# Patient Record
Sex: MALE | Race: WHITE | NOT HISPANIC OR LATINO | Employment: UNEMPLOYED | URBAN - METROPOLITAN AREA
[De-identification: names, ages, dates, MRNs, and addresses within clinical notes are randomized per-mention and may not be internally consistent; named-entity substitution may affect disease eponyms.]

---

## 2017-08-09 ENCOUNTER — OFFICE VISIT (OUTPATIENT)
Dept: CARDIOLOGY | Facility: PHYSICIAN GROUP | Age: 62
End: 2017-08-09
Payer: MEDICAID

## 2017-08-09 VITALS
SYSTOLIC BLOOD PRESSURE: 130 MMHG | BODY MASS INDEX: 24.44 KG/M2 | DIASTOLIC BLOOD PRESSURE: 88 MMHG | HEART RATE: 85 BPM | HEIGHT: 69 IN | OXYGEN SATURATION: 94 % | WEIGHT: 165 LBS

## 2017-08-09 DIAGNOSIS — I45.10 RIGHT BUNDLE BRANCH BLOCK: ICD-10-CM

## 2017-08-09 DIAGNOSIS — E78.5 DYSLIPIDEMIA: ICD-10-CM

## 2017-08-09 DIAGNOSIS — Z82.49 FAMILY HISTORY OF CORONARY ARTERY DISEASE: ICD-10-CM

## 2017-08-09 PROCEDURE — 99204 OFFICE O/P NEW MOD 45 MIN: CPT | Performed by: INTERNAL MEDICINE

## 2017-08-09 RX ORDER — TAMSULOSIN HYDROCHLORIDE 0.4 MG/1
CAPSULE ORAL
Refills: 3 | COMMUNITY
Start: 2017-07-07

## 2017-08-09 RX ORDER — ATORVASTATIN CALCIUM 40 MG/1
40 TABLET, FILM COATED ORAL EVERY EVENING
Refills: 3 | COMMUNITY
Start: 2017-06-30

## 2017-08-09 RX ORDER — ALBUTEROL SULFATE 90 UG/1
AEROSOL, METERED RESPIRATORY (INHALATION)
Refills: 5 | COMMUNITY
Start: 2017-07-07

## 2017-08-09 ASSESSMENT — ENCOUNTER SYMPTOMS
EYE REDNESS: 1
PALPITATIONS: 1
INSOMNIA: 1
TINGLING: 1
HEARTBURN: 1

## 2017-08-09 NOTE — Clinical Note
Name:          Tai Long   YOB: 1955  Date:     8/9/2017      MD Maximiliano Pagan MD  [address], [place] [address]  [place] [address]  Phone: [phone]  Back Line: [phone]  Fax: [phone]  E-mail: [email or web address]   Dear Dr. rSivastava,    We had the pleasure of seeing your patient, Tai Long, in Cardiology Clinic at [facility] and Vascular today.    As you know, he is a 62-year-old man with a history of COPD, mild carotid stenosis, and prediabetes referred for evaluation of a right bundle branch block on his EKG.    Though I would generally not suspect structural heart disease with his right bundle branch block in the setting of his other risk factors for obstructive coronary disease including dyslipidemia and his family history of coronary artery disease I did order an echocardiogram. Otherwise at this time I have neither changed his cardiovascular regimen consisting of atorvastatin nor ordered additional testing.    We will have the patient follow-up in 3 months.    Thank you for the referral and please do not hesitate to contact me at any time. My contact information is listed above.    This note was dictated using Dragon speech recognition software.     A full note including my physical examination and a full list of rectified medications is available in our medical record, and can be faxed as well.    Maximiliano Salazar MD  Cardiologist  [facility]

## 2017-08-09 NOTE — MR AVS SNAPSHOT
"        Tai Long   2017 9:45 AM   Office Visit   MRN: [id number]    Department:  [facility] Phone:  [phone]    Description:  Male : 1955   Provider:  Maximiliano Salazar M.D.           Reason for Visit     New Patient           Allergies as of 2017     Allergen Noted Reactions    Hydrocodone-Acetaminophen 2017         You were diagnosed with     Dyslipidemia   [537098]       Right bundle branch block   [426.4.ICD-9-CM]         Vital Signs     Blood Pressure Pulse Height Weight Body Mass Index Oxygen Saturation    130/88 mmHg 85 1.753 m (5' 9.02\") 74.844 kg (165 lb) 24.36 kg/m2 94%    Smoking Status                   Former Smoker           Basic Information     Date Of Birth Sex Race Preferred Language       1955 Male White English       Your appointments     Sep 13, 2017  7:45 AM   [facility]    [address] [address]   [phone]            2017  9:15 AM   FOLLOW UP with Maximiliano Salazar M.D.   [facility]    [address] [address]   [phone]              Problem List              ICD-10-CM Priority Class Noted - Resolved    Dyslipidemia E78.5   2017 - Present    Right bundle branch block I45.10   2017 - Present      Health Maintenance     Patient has no pending health maintenance at this time      Current Immunizations     No immunizations on file.      Below and/or attached are the medications your provider expects you to take. Review all of your home medications and newly ordered medications with your provider and/or pharmacist. Follow medication instructions as directed by your provider and/or pharmacist. Please keep your medication list with you and share with your provider. Update the information when medications are discontinued, doses are changed, or new " medications (including over-the-counter products) are added; and carry medication information at all times in the event of emergency situations     Allergies:  HYDROCODONE-ACETAMINOPHEN - (reactions not documented)               Medications  Valid as of: August 09, 2017 - 10:19 AM    Generic Name Brand Name Tablet Size Instructions for use    Albuterol Sulfate (Aero Soln) VENTOLIN  (90 BASE) MCG/ACT TAKE 1-2 PUFFS BY INHALATION EVERY 4 HOURS AS NEEDED FOR COUGH, WHEEZING, OR SHORTNESS OF BREATH        Atorvastatin Calcium (Tab) LIPITOR 40 MG Take 40 mg by mouth every evening. For cholesterol        Fluticasone Furoate-Vilanterol (AEROSOL POWDER, BREATH ACTIVATED) BREO ELLIPTA 200-25 MCG/INH TAKE ONE PUFF ONE TIME DAILY, FOR BREATHING        Tamsulosin HCl (Cap) FLOMAX 0.4 MG TAKE ONE CAPSULE BY MOUTH EVERY DAY 30 MINUTES AFTER SAME MEAL        .                 Medicines prescribed today were sent to:     None      Medication refill instructions:       If your prescription bottle indicates you have medication refills left, it is not necessary to call your provider’s office. Please contact your pharmacy and they will refill your medication.    If your prescription bottle indicates you do not have any refills left, you may request refills at any time through one of the following ways: The online Metaplace system (except Urgent Care), by calling your provider’s office, or by asking your pharmacy to contact your provider’s office with a refill request. Medication refills are processed only during regular business hours and may not be available until the next business day. Your provider may request additional information or to have a follow-up visit with you prior to refilling your medication.   *Please Note: Medication refills are assigned a new Rx number when refilled electronically. Your pharmacy may indicate that no refills were authorized even though a new prescription for the same medication is available at  the pharmacy. Please request the medicine by name with the pharmacy before contacting your provider for a refill.        Your To Do List     Future Labs/Procedures Complete By Expires    Echocardiogram Comp w/o Cont  As directed 8/9/2018         Pixate Access Code: [email or web address]  Expires: 9/8/2017 10:17 AM    Weeleohart  A secure, online tool to manage your health information     [employer] Pixate® is a secure, online tool that connects you to your personalized health information from the privacy of your home -- day or night - making it very easy for you to manage your healthcare. Once the activation process is completed, you can even access your medical information using the Pixate tawnya, which is available for free in the Apple Tawnya store or Google Play store.     Pixate provides the following levels of access (as shown below):   My Chart Features   [facility] Doctor [facility]  Special[facility]  Urgent  Care [facility]  Primary Care  Doctor   Email your healthcare team securely and privately 24/7 X X X    Manage appointments: schedule your next appointment; view details of past/upcoming appointments X      Request prescription refills. X      View recent personal medical records, including lab and immunizations X X X X   View health record, including health history, allergies, medications X X X X   Read reports about your outpatient visits, procedures, consult and ER notes X X X X   See your discharge summary, which is a recap of your hospital and/or ER visit that includes your diagnosis, lab results, and care plan. X X       How to register for Pixate:  1. Go to  [email or web address].  2. Click on the Sign Up Now box, which takes you to the New Member Sign Up page. You will need to provide the following information:  a. Enter your Pixate Access Code exactly as it appears at the top of this page. (You will not need to use this code after you’ve completed the sign-up process. If you do not  sign up before the expiration date, you must request a new code.)   b. Enter your date of birth.   c. Enter your home email address.   d. Click Submit, and follow the next screen’s instructions.  3. Create a Solexant ID. This will be your Solexant login ID and cannot be changed, so think of one that is secure and easy to remember.  4. Create a In*Situ Architecturet password. You can change your password at any time.  5. Enter your Password Reset Question and Answer. This can be used at a later time if you forget your password.   6. Enter your e-mail address. This allows you to receive e-mail notifications when new information is available in Solexant.  7. Click Sign Up. You can now view your health information.    For assistance activating your Solexant account, call [phone]

## 2017-08-09 NOTE — Clinical Note
[facility]   [address] [place] [address]  Phone: [phone]  Fax: [phone]              Tai Long  1955    Encounter Date: 8/9/2017    Maximiliano Salazar M.D.          PROGRESS NOTE:  Subjective:   Tai Long is a 62 -year-old man with a history of COPD, mild carotid stenosis, and prediabetes referred for evaluation of a right bundle branch block on his EKG.    He has few cardiovascular complaints today coming in to discuss his right bundle branch block seen on EKG. He does tell me that he has palpitations about every 2 months that lasted 15 seconds. He describes it as a right-sided fluttering that does not radiate to his neck. It is not severe nor concerning to him. Apart from that, he has no cardiovascular complaints or exercise intolerance. He is a former smoker with COPD, and a retired meyer.    Past Medical History   Diagnosis Date   • Dyslipidemia 8/9/2017   • Right bundle branch block 8/9/2017   • BPH (benign prostatic hyperplasia)    • Pre-diabetes    • COPD (chronic obstructive pulmonary disease) (CMS-[facility])    • Former smoker    • Right bundle branch block 8/9/2017   • Family history of coronary artery disease 8/9/2017     History reviewed. No pertinent past surgical history.  Family History   Problem Relation Age of Onset   • Heart Attack Father 55     History   Smoking status   • Former Smoker -- 45 years   • Quit date: 01/01/2015   Smokeless tobacco   • Not on file     Allergies   Allergen Reactions   • Hydrocodone-Acetaminophen      Outpatient Encounter Prescriptions as of 8/9/2017   Medication Sig Dispense Refill   • BREO ELLIPTA 200-25 MCG/INH AEROSOL POWDER, BREATH ACTIVATED TAKE ONE PUFF ONE TIME DAILY, FOR BREATHING  5   • tamsulosin (FLOMAX) 0.4 MG capsule TAKE ONE CAPSULE BY MOUTH EVERY DAY 30 MINUTES AFTER SAME MEAL  3   • VENTOLIN  (90 BASE) MCG/ACT Aero Soln inhalation aerosol TAKE 1-2  "PUFFS BY INHALATION EVERY 4 HOURS AS NEEDED FOR COUGH, WHEEZING, OR SHORTNESS OF BREATH  5   • atorvastatin (LIPITOR) 40 MG Tab Take 40 mg by mouth every evening. For cholesterol  3     No facility-administered encounter medications on file as of 8/9/2017.     Review of Systems   Eyes: Positive for redness.   Cardiovascular: Positive for palpitations.   Gastrointestinal: Positive for heartburn.   Neurological: Positive for tingling.   Psychiatric/Behavioral: The patient has insomnia.    All other systems reviewed and are negative.       Objective:   /88 mmHg  Pulse 85  Ht 1.753 m (5' 9.02\")  Wt 74.844 kg (165 lb)  BMI 24.36 kg/m2  SpO2 94%    Physical Exam   Constitutional: He is oriented to person, place, and time. He appears well-developed and well-nourished. No distress.   Very pleasant, middle-age man in no distress   HENT:   Head: Normocephalic and atraumatic.   Eyes: Conjunctivae and EOM are normal. Pupils are equal, round, and reactive to light. No scleral icterus.   Neck: Neck supple. No JVD present. No tracheal deviation present.   Cardiovascular: Normal rate, regular rhythm, normal heart sounds and intact distal pulses.  Exam reveals no gallop and no friction rub.    No murmur heard.  Pulses:       Dorsalis pedis pulses are 2+ on the right side, and 2+ on the left side.   No carotid bruits   Pulmonary/Chest: Effort normal and breath sounds normal. No stridor. No respiratory distress. He has no wheezes. He has no rales.   Abdominal: Soft. Bowel sounds are normal. He exhibits no distension.   Musculoskeletal: He exhibits no edema.   Neurological: He is alert and oriented to person, place, and time.   Skin: Skin is warm and dry. No rash noted. He is not diaphoretic. No erythema. No pallor.   Psychiatric: He has a normal mood and affect. Judgment and thought content normal.   Vitals reviewed.    I reviewed his EKG from June that documented a right bundle branch block, rhythm is sinus and no other " ischemic changes were present    Assessment:     1. Dyslipidemia  LIPID PANEL   2. Right bundle branch block  Echocardiogram Comp w/o Cont   3. Family history of coronary artery disease         Medical Decision Making:  Today's Assessment / Status / Plan:     Though I would generally not suspect structural heart disease with his right bundle branch block in the setting of his other risk factors for obstructive coronary disease including dyslipidemia and his family history of coronary artery disease I did order an echocardiogram. Otherwise at this time I have neither changed his cardiovascular regimen consisting of atorvastatin nor ordered additional testing.    Maximiliano aSlazar MD  Cardiologist, [facility] [place]             Name:          Tai Long   YOB: 1955  Date:     8/9/2017      MD Maximiliano Pagan MD  [address], [address] [address]  Phone: [phone]  Back Line: [phone]  Fax: [phone]  E-mail: [email or web address]   Dear Dr. Srivastava,    We had the pleasure of seeing your patient, Tai Long, in Cardiology Clinic at [facility] and Vascular today.    As you know, he is a 62-year-old man with a history of COPD, mild carotid stenosis, and prediabetes referred for evaluation of a right bundle branch block on his EKG.    Though I would generally not suspect structural heart disease with his right bundle branch block in the setting of his other risk factors for obstructive coronary disease including dyslipidemia and his family history of coronary artery disease I did order an echocardiogram. Otherwise at this time I have neither changed his cardiovascular regimen consisting of atorvastatin nor ordered additional testing.    We will have the patient follow-up in 3 months.    Thank you for the referral and please do not hesitate to contact me at any time. My contact information is listed above.    This note was dictated using Dragon  speech recognition software.     A full note including my physical examination and a full list of rectified medications is available in our medical record, and can be faxed as well.    Maximiliano Salazar MD  Cardiologist  [facility]        Connie Srivastava M.D.  [address] [address]  VIA Facsimile: [phone]

## 2017-08-09 NOTE — Clinical Note
Name:          Tai Long   YOB: 1955  Date:     8/9/2017      MD Maximiliano Pagan MD  [address] [place] [address]  [place] [address]  Phone: [phone]  Back Line: [phone]  Fax: [phone]  E-mail: [email or web address]   Dear Dr. Srivastava,    We had the pleasure of seeing your patient, Tai Long, in Cardiology Clinic at [facility] and Vascular today.    As you know, he is a 62-year-old man with a history of COPD, mild carotid stenosis, and prediabetes referred for evaluation of a right bundle branch block on his EKG.    Though I would generally not suspect structural heart disease with his right bundle branch block in the setting of his other risk factors for obstructive coronary disease including dyslipidemia and his family history of coronary artery disease I did order an echocardiogram. Otherwise at this time I have neither changed his cardiovascular regimen consisting of atorvastatin nor ordered additional testing.    We will have the patient follow-up in 3 months.    Thank you for the referral and please do not hesitate to contact me at any time. My contact information is listed above.    This note was dictated using Dragon speech recognition software.     A full note including my physical examination and a full list of rectified medications is available in our medical record, and can be faxed as well.    Maximiliano Salazar MD  Cardiologist  [facility]

## 2017-08-09 NOTE — PROGRESS NOTES
Subjective:   Tai Long is a 62 -year-old man with a history of COPD, mild carotid stenosis, and prediabetes referred for evaluation of a right bundle branch block on his EKG.    He has few cardiovascular complaints today coming in to discuss his right bundle branch block seen on EKG. He does tell me that he has palpitations about every 2 months that lasted 15 seconds. He describes it as a right-sided fluttering that does not radiate to his neck. It is not severe nor concerning to him. Apart from that, he has no cardiovascular complaints or exercise intolerance. He is a former smoker with COPD, and a retired meyer.    Past Medical History   Diagnosis Date   • Dyslipidemia 8/9/2017   • Right bundle branch block 8/9/2017   • BPH (benign prostatic hyperplasia)    • Pre-diabetes    • COPD (chronic obstructive pulmonary disease) (CMS-[facility])    • Former smoker    • Right bundle branch block 8/9/2017   • Family history of coronary artery disease 8/9/2017     History reviewed. No pertinent past surgical history.  Family History   Problem Relation Age of Onset   • Heart Attack Father 55     History   Smoking status   • Former Smoker -- 45 years   • Quit date: 01/01/2015   Smokeless tobacco   • Not on file     Allergies   Allergen Reactions   • Hydrocodone-Acetaminophen      Outpatient Encounter Prescriptions as of 8/9/2017   Medication Sig Dispense Refill   • BREO ELLIPTA 200-25 MCG/INH AEROSOL POWDER, BREATH ACTIVATED TAKE ONE PUFF ONE TIME DAILY, FOR BREATHING  5   • tamsulosin (FLOMAX) 0.4 MG capsule TAKE ONE CAPSULE BY MOUTH EVERY DAY 30 MINUTES AFTER SAME MEAL  3   • VENTOLIN  (90 BASE) MCG/ACT Aero Soln inhalation aerosol TAKE 1-2 PUFFS BY INHALATION EVERY 4 HOURS AS NEEDED FOR COUGH, WHEEZING, OR SHORTNESS OF BREATH  5   • atorvastatin (LIPITOR) 40 MG Tab Take 40 mg by mouth every evening. For cholesterol  3     No facility-administered encounter medications on file as of 8/9/2017.     Review of Systems  "  Eyes: Positive for redness.   Cardiovascular: Positive for palpitations.   Gastrointestinal: Positive for heartburn.   Neurological: Positive for tingling.   Psychiatric/Behavioral: The patient has insomnia.    All other systems reviewed and are negative.       Objective:   /88 mmHg  Pulse 85  Ht 1.753 m (5' 9.02\")  Wt 74.844 kg (165 lb)  BMI 24.36 kg/m2  SpO2 94%    Physical Exam   Constitutional: He is oriented to person, place, and time. He appears well-developed and well-nourished. No distress.   Very pleasant, middle-age man in no distress   HENT:   Head: Normocephalic and atraumatic.   Eyes: Conjunctivae and EOM are normal. Pupils are equal, round, and reactive to light. No scleral icterus.   Neck: Neck supple. No JVD present. No tracheal deviation present.   Cardiovascular: Normal rate, regular rhythm, normal heart sounds and intact distal pulses.  Exam reveals no gallop and no friction rub.    No murmur heard.  Pulses:       Dorsalis pedis pulses are 2+ on the right side, and 2+ on the left side.   No carotid bruits   Pulmonary/Chest: Effort normal and breath sounds normal. No stridor. No respiratory distress. He has no wheezes. He has no rales.   Abdominal: Soft. Bowel sounds are normal. He exhibits no distension.   Musculoskeletal: He exhibits no edema.   Neurological: He is alert and oriented to person, place, and time.   Skin: Skin is warm and dry. No rash noted. He is not diaphoretic. No erythema. No pallor.   Psychiatric: He has a normal mood and affect. Judgment and thought content normal.   Vitals reviewed.    I reviewed his EKG from June that documented a right bundle branch block, rhythm is sinus and no other ischemic changes were present    Assessment:     1. Dyslipidemia  LIPID PANEL   2. Right bundle branch block  Echocardiogram Comp w/o Cont   3. Family history of coronary artery disease         Medical Decision Making:  Today's Assessment / Status / Plan:     Though I would " generally not suspect structural heart disease with his right bundle branch block in the setting of his other risk factors for obstructive coronary disease including dyslipidemia and his family history of coronary artery disease I did order an echocardiogram. Otherwise at this time I have neither changed his cardiovascular regimen consisting of atorvastatin nor ordered additional testing.    Maximiliano Salazar MD  Cardiologist, [facility] [place]

## 2017-08-09 NOTE — PROGRESS NOTES
Name:          Tai Long   YOB: 1955  Date:     8/9/2017      MD Maximiliano Pagan MD  [address], [place] [address]  [place] [address]  Phone: [phone]  Back Line: [phone]  Fax: [phone]  E-mail: [email or web address]   Dear Dr. Srivastava,    We had the pleasure of seeing your patient, Tai Long, in Cardiology Clinic at [facility] and Vascular today.    As you know, he is a 62-year-old man with a history of COPD, mild carotid stenosis, and prediabetes referred for evaluation of a right bundle branch block on his EKG.    Though I would generally not suspect structural heart disease with his right bundle branch block in the setting of his other risk factors for obstructive coronary disease including dyslipidemia and his family history of coronary artery disease I did order an echocardiogram. Otherwise at this time I have neither changed his cardiovascular regimen consisting of atorvastatin nor ordered additional testing.    We will have the patient follow-up in 3 months.    Thank you for the referral and please do not hesitate to contact me at any time. My contact information is listed above.    This note was dictated using Dragon speech recognition software.     A full note including my physical examination and a full list of rectified medications is available in our medical record, and can be faxed as well.    Maximiliano Salazar MD  Cardiologist  [facility] and [facility] Health

## 2017-09-13 ENCOUNTER — NON-PROVIDER VISIT (OUTPATIENT)
Dept: CARDIOLOGY | Facility: PHYSICIAN GROUP | Age: 62
End: 2017-09-13
Payer: MEDICAID

## 2017-09-13 DIAGNOSIS — I45.10 RIGHT BUNDLE BRANCH BLOCK: ICD-10-CM

## 2017-09-14 ENCOUNTER — TELEPHONE (OUTPATIENT)
Dept: CARDIOLOGY | Facility: MEDICAL CENTER | Age: 62
End: 2017-09-14

## 2017-09-14 LAB
LV EJECT FRACT  99904: 55
LV EJECT FRACT MOD 2C 99903: 50.87
LV EJECT FRACT MOD 4C 99902: 50.53
LV EJECT FRACT MOD BP 99901: 52.14

## 2017-09-14 NOTE — TELEPHONE ENCOUNTER
----- Message from Maximiliano Salazar M.D. sent at 9/14/2017 10:52 AM PDT -----  Normal echocardiogram (normal heart muscle function, normal valves)

## 2017-09-14 NOTE — LETTER
September 14, 2017        Tai Long  [address]  [place] [address]        Dear Tai:    Your cardiologist Dr. Maximiliano Salazar has reviewed your echocardiogram from 9/13/2017.  The results are normal.    If you have any questions, please call the office at [phone].        Sincerely,              [facility]

## 2017-09-14 NOTE — TELEPHONE ENCOUNTER
Tried calling patient again, no answer. Mailed patient a letter regarding his normal echocardiogram result.    PEDRO PABLO PAYAN

## 2017-09-14 NOTE — TELEPHONE ENCOUNTER
Left patient a voicemail with instructions to call the office for test results (Echocardiogram 9/13/2017).    PEDRO PABLO RN

## 2017-11-29 ENCOUNTER — OFFICE VISIT (OUTPATIENT)
Dept: CARDIOLOGY | Facility: PHYSICIAN GROUP | Age: 62
End: 2017-11-29
Payer: MEDICAID

## 2017-11-29 VITALS
OXYGEN SATURATION: 94 % | BODY MASS INDEX: 25.03 KG/M2 | WEIGHT: 169 LBS | HEIGHT: 69 IN | SYSTOLIC BLOOD PRESSURE: 140 MMHG | DIASTOLIC BLOOD PRESSURE: 88 MMHG | HEART RATE: 99 BPM

## 2017-11-29 DIAGNOSIS — E78.5 DYSLIPIDEMIA: ICD-10-CM

## 2017-11-29 DIAGNOSIS — Z82.49 FAMILY HISTORY OF CORONARY ARTERY DISEASE: ICD-10-CM

## 2017-11-29 DIAGNOSIS — I45.10 RIGHT BUNDLE BRANCH BLOCK: Primary | ICD-10-CM

## 2017-11-29 DIAGNOSIS — Z87.891 FORMER SMOKER: ICD-10-CM

## 2017-11-29 PROCEDURE — 99214 OFFICE O/P EST MOD 30 MIN: CPT | Performed by: INTERNAL MEDICINE

## 2017-11-29 RX ORDER — VELPATASVIR AND SOFOSBUVIR 100; 400 MG/1; MG/1
TABLET, FILM COATED ORAL
COMMUNITY

## 2017-11-29 ASSESSMENT — ENCOUNTER SYMPTOMS
EYE REDNESS: 1
TINGLING: 1
HEARTBURN: 1
INSOMNIA: 1
CARDIOVASCULAR NEGATIVE: 1

## 2017-11-29 NOTE — LETTER
Name:          Tai Long   YOB: 1955  Date:     11/29/2017      Connie Srivastava M.D.  [address] [facility] [address]     Maximiliano Salazar MD  [address], [facility] [address]  [place] [address]  Phone: [phone]  Back Line: [phone]  Fax: [phone]  E-mail: [email or web address]   Dear Dr. Srivastava,    We had the pleasure of seeing your patient, Tai Long, in Cardiology Clinic at [facility] today.    As you know, he is a 62-year-old man with a history of COPD, mild carotid stenosis, and prediabetes referred for evaluation of a right bundle branch block on his EKG.    He again has no cardiovascular complaints and comes in to review the results of his echocardiogram that showed normal left ventricular ejection fraction and no valvular disease. His lipids are look good on atorvastatin presently. I do think in light of his other risk factors that a coronary CT calcium score may be useful in the future though I have deferred that for now.    Return in about 10 months (around 9/29/2018).    Thank you for the referral and please do not hesitate to contact me at any time. My contact information is listed above.    This note was dictated using Dragon speech recognition software.     A full note including my physical examination and a full list of rectified medications is available in our medical record, and can be faxed as well.    Maximiliano Salazar MD  Cardiologist  [facility]

## 2017-11-29 NOTE — PROGRESS NOTES
Subjective:   Tai Long is a 62 -year-old man with a history of COPD, mild carotid stenosis, and prediabetes referred for evaluation of a right bundle branch block on his EKG.    He is doing well today, and comes in to review the results of his echocardiogram from September that showed normal left ventricular ejection fraction and no other structural abnormality. That was done in the setting of his baseline right bundle branch block as well as other risk factors including smoking and family history as well as dyspnea that was mostly associated with COPD.    Past Medical History:   Diagnosis Date   • BPH (benign prostatic hyperplasia)    • COPD (chronic obstructive pulmonary disease) ([facility])    • Dyslipidemia 8/9/2017   • Family history of coronary artery disease 8/9/2017   • Former smoker    • Pre-diabetes    • Right bundle branch block 8/9/2017   • Right bundle branch block 8/9/2017     History reviewed. No pertinent surgical history.  Family History   Problem Relation Age of Onset   • Heart Attack Father 55     History   Smoking Status   • Former Smoker   • Years: 45.00   • Quit date: 1/1/2015   Smokeless Tobacco   • Never Used     Allergies   Allergen Reactions   • Hydrocodone-Acetaminophen    • Lisinopril Swelling     Face swelling     Outpatient Encounter Prescriptions as of 11/29/2017   Medication Sig Dispense Refill   • Sofosbuvir-Velpatasvir (EPCLUSA) 400-100 MG Tab Take  by mouth.     • BREO ELLIPTA 200-25 MCG/INH AEROSOL POWDER, BREATH ACTIVATED TAKE ONE PUFF ONE TIME DAILY, FOR BREATHING  5   • tamsulosin (FLOMAX) 0.4 MG capsule TAKE ONE CAPSULE BY MOUTH EVERY DAY 30 MINUTES AFTER SAME MEAL  3   • VENTOLIN  (90 BASE) MCG/ACT Aero Soln inhalation aerosol TAKE 1-2 PUFFS BY INHALATION EVERY 4 HOURS AS NEEDED FOR COUGH, WHEEZING, OR SHORTNESS OF BREATH  5   • atorvastatin (LIPITOR) 40 MG Tab Take 40 mg by mouth every evening. For cholesterol  3     No facility-administered encounter medications  "on file as of 11/29/2017.      Review of Systems   Eyes: Positive for redness.   Cardiovascular: Negative.    Gastrointestinal: Positive for heartburn.   Neurological: Positive for tingling.   Psychiatric/Behavioral: The patient has insomnia.    All other systems reviewed and are negative.       Objective:   /88   Pulse 99   Ht 1.753 m (5' 9\")   Wt 76.7 kg (169 lb)   SpO2 94%   BMI 24.96 kg/m²     Physical Exam   Constitutional: He is oriented to person, place, and time. He appears well-developed and well-nourished. No distress.   Very pleasant, middle-age man in no distress   HENT:   Head: Normocephalic and atraumatic.   Eyes: Conjunctivae and EOM are normal. Pupils are equal, round, and reactive to light. No scleral icterus.   Neck: Neck supple. No JVD present. No tracheal deviation present.   Cardiovascular: Normal rate, regular rhythm, normal heart sounds and intact distal pulses.  Exam reveals no gallop and no friction rub.    No murmur heard.  Pulses:       Dorsalis pedis pulses are 2+ on the right side, and 2+ on the left side.   No carotid bruits   Pulmonary/Chest: Effort normal and breath sounds normal. No stridor. No respiratory distress. He has no wheezes. He has no rales.   Abdominal: Soft. Bowel sounds are normal. He exhibits no distension.   Musculoskeletal: He exhibits no edema.   Neurological: He is alert and oriented to person, place, and time.   Skin: Skin is warm and dry. No rash noted. He is not diaphoretic. No erythema. No pallor.   Psychiatric: He has a normal mood and affect. Judgment and thought content normal.   Vitals reviewed.    Lipids, 9/6/2017: LDL 68, HDL 44, triglycerides 45, total cholesterol 135    I reviewed his EKG from June that documented a right bundle branch block, rhythm is sinus and no other ischemic changes were present    Echocardiogram, 9/13/2017:  \"CONCLUSIONS  Left ventricular systolic function is low normal.  No significant valve abnormalities.   No prior " "study is available for comparison\"    Assessment:     1. Right bundle branch block     2. Dyslipidemia     3. Family history of coronary artery disease     4. Former smoker         Medical Decision Making:  Today's Assessment / Status / Plan:     He again has no cardiovascular complaints and comes in to review the results of his echocardiogram that showed normal left ventricular ejection fraction and no valvular disease. His lipids are look good on atorvastatin presently. I do think in light of his other risk factors that a coronary CT calcium score may be useful in the future though I have deferred that for now.    Maximiliano Salazar MD  Cardiologist, [facility]     Return in about 10 months (around 9/29/2018).    "

## 2018-06-26 ENCOUNTER — HOSPITAL ENCOUNTER (EMERGENCY)
Facility: MEDICAL CENTER | Age: 63
End: 2018-06-26
Attending: EMERGENCY MEDICINE
Payer: MEDICAID

## 2018-06-26 VITALS
HEART RATE: 109 BPM | RESPIRATION RATE: 16 BRPM | SYSTOLIC BLOOD PRESSURE: 133 MMHG | BODY MASS INDEX: 24.51 KG/M2 | TEMPERATURE: 99.2 F | WEIGHT: 166.01 LBS | DIASTOLIC BLOOD PRESSURE: 98 MMHG | OXYGEN SATURATION: 93 %

## 2018-06-26 DIAGNOSIS — S61.412A LACERATION OF LEFT HAND WITHOUT FOREIGN BODY, INITIAL ENCOUNTER: ICD-10-CM

## 2018-06-26 PROCEDURE — 700111 HCHG RX REV CODE 636 W/ 250 OVERRIDE (IP): Performed by: EMERGENCY MEDICINE

## 2018-06-26 PROCEDURE — 303747 HCHG EXTRA SUTURE

## 2018-06-26 PROCEDURE — 90715 TDAP VACCINE 7 YRS/> IM: CPT | Performed by: EMERGENCY MEDICINE

## 2018-06-26 PROCEDURE — 304217 HCHG IRRIGATION SYSTEM

## 2018-06-26 PROCEDURE — 304999 HCHG REPAIR-SIMPLE/INTERMED LEVEL 1

## 2018-06-26 PROCEDURE — 99283 EMERGENCY DEPT VISIT LOW MDM: CPT

## 2018-06-26 PROCEDURE — 90471 IMMUNIZATION ADMIN: CPT

## 2018-06-26 RX ORDER — LIDOCAINE HYDROCHLORIDE AND EPINEPHRINE BITARTRATE 20; .01 MG/ML; MG/ML
INJECTION, SOLUTION SUBCUTANEOUS
Status: DISCONTINUED
Start: 2018-06-26 | End: 2018-06-26 | Stop reason: HOSPADM

## 2018-06-26 RX ORDER — CEPHALEXIN 500 MG/1
500 CAPSULE ORAL 4 TIMES DAILY
Qty: 20 CAP | Refills: 0 | Status: SHIPPED | OUTPATIENT
Start: 2018-06-26 | End: 2018-07-01

## 2018-06-26 RX ADMIN — CLOSTRIDIUM TETANI TOXOID ANTIGEN (FORMALDEHYDE INACTIVATED), CORYNEBACTERIUM DIPHTHERIAE TOXOID ANTIGEN (FORMALDEHYDE INACTIVATED), BORDETELLA PERTUSSIS TOXOID ANTIGEN (GLUTARALDEHYDE INACTIVATED), BORDETELLA PERTUSSIS FILAMENTOUS HEMAGGLUTININ ANTIGEN (FORMALDEHYDE INACTIVATED), BORDETELLA PERTUSSIS PERTACTIN ANTIGEN, AND BORDETELLA PERTUSSIS FIMBRIAE 2/3 ANTIGEN 0.5 ML: 5; 2; 2.5; 5; 3; 5 INJECTION, SUSPENSION INTRAMUSCULAR at 13:22

## 2018-06-26 ASSESSMENT — LIFESTYLE VARIABLES: DO YOU DRINK ALCOHOL: NO

## 2018-06-26 NOTE — DISCHARGE INSTRUCTIONS
Laceration Care, Adult  A laceration is a cut that goes through all layers of the skin. The cut also goes into the tissue that is right under the skin. Some cuts heal on their own. Others need to be closed with stitches (sutures), staples, skin adhesive strips, or wound glue. Taking care of your cut lowers your risk of infection and helps your cut to heal better.  HOW TO TAKE CARE OF YOUR CUT  For stitches or staples:  · Keep the wound clean and dry.  · If you were given a bandage (dressing), you should change it at least one time per day or as told by your doctor. You should also change it if it gets wet or dirty.  · Keep the wound completely dry for the first 24 hours or as told by your doctor. After that time, you may take a shower or a bath. However, make sure that the wound is not soaked in water until after the stitches or staples have been removed.  · Clean the wound one time each day or as told by your doctor:  ¨ Wash the wound with soap and water.  ¨ Rinse the wound with water until all of the soap comes off.  ¨ Pat the wound dry with a clean towel. Do not rub the wound.  · After you clean the wound, put a thin layer of antibiotic ointment on it as told by your doctor. This ointment:  ¨ Helps to prevent infection.  ¨ Keeps the bandage from sticking to the wound.  · Have your stitches or staples removed as told by your doctor.  If your doctor used skin adhesive strips:   · Keep the wound clean and dry.  · If you were given a bandage, you should change it at least one time per day or as told by your doctor. You should also change it if it gets dirty or wet.  · Do not get the skin adhesive strips wet. You can take a shower or a bath, but be careful to keep the wound dry.  · If the wound gets wet, pat it dry with a clean towel. Do not rub the wound.  · Skin adhesive strips fall off on their own. You can trim the strips as the wound heals. Do not remove any strips that are still stuck to the wound. They will  fall off after a while.  If your doctor used wound glue:  · Try to keep your wound dry, but you may briefly wet it in the shower or bath. Do not soak the wound in water, such as by swimming.  · After you take a shower or a bath, gently pat the wound dry with a clean towel. Do not rub the wound.  · Do not do any activities that will make you really sweaty until the skin glue has fallen off on its own.  · Do not apply liquid, cream, or ointment medicine to your wound while the skin glue is still on.  · If you were given a bandage, you should change it at least one time per day or as told by your doctor. You should also change it if it gets dirty or wet.  · If a bandage is placed over the wound, do not let the tape for the bandage touch the skin glue.  · Do not pick at the glue. The skin glue usually stays on for 5-10 days. Then, it falls off of the skin.  General Instructions   · To help prevent scarring, make sure to cover your wound with sunscreen whenever you are outside after stitches are removed, after adhesive strips are removed, or when wound glue stays in place and the wound is healed. Make sure to wear a sunscreen of at least 30 SPF.  · Take over-the-counter and prescription medicines only as told by your doctor.  · If you were given antibiotic medicine or ointment, take or apply it as told by your doctor. Do not stop using the antibiotic even if your wound is getting better.  · Do not scratch or pick at the wound.  · Keep all follow-up visits as told by your doctor. This is important.  · Check your wound every day for signs of infection. Watch for:  ¨ Redness, swelling, or pain.  ¨ Fluid, blood, or pus.  · Raise (elevate) the injured area above the level of your heart while you are sitting or lying down, if possible.  GET HELP IF:  · You got a tetanus shot and you have any of these problems at the injection site:  ¨ Swelling.  ¨ Very bad pain.  ¨ Redness.  ¨ Bleeding.  · You have a fever.  · A wound that was  closed breaks open.  · You notice a bad smell coming from your wound or your bandage.  · You notice something coming out of the wound, such as wood or glass.  · Medicine does not help your pain.  · You have more redness, swelling, or pain at the site of your wound.  · You have fluid, blood, or pus coming from your wound.  · You notice a change in the color of your skin near your wound.  · You need to change the bandage often because fluid, blood, or pus is coming from the wound.  · You start to have a new rash.  · You start to have numbness around the wound.  GET HELP RIGHT AWAY IF:  · You have very bad swelling around the wound.  · Your pain suddenly gets worse and is very bad.  · You notice painful lumps near the wound or on skin that is anywhere on your body.  · You have a red streak going away from your wound.  · The wound is on your hand or foot and you cannot move a finger or toe like you usually can.  · The wound is on your hand or foot and you notice that your fingers or toes look pale or bluish.     This information is not intended to replace advice given to you by your health care provider. Make sure you discuss any questions you have with your health care provider.     Document Released: 06/05/2009 Document Revised: 05/03/2016 Document Reviewed: 12/14/2015  [employer] Interactive Patient Education ©2016 [employer]

## 2018-06-26 NOTE — ED TRIAGE NOTES
Pt comes in complaining of a laceration to his left thumb. Pt stating he was working with a fine tool when it slipped.

## 2018-06-26 NOTE — ED NOTES
Present to ED because of fall no    Age>70 no    Alerted Mental Status no    Impaired Mobility no      Nursing Judgement no      Fall Risk Interventions     1. Move pt closer to nurses station  2.Familiarize the pt with enviorment  3.Place call light within reach and demonstrate use  4.Keep pt's personal possesions within pt safe reach  5.Place stretcher in low position with locked brake  6.Place yellow socks and arm band on pt  7.Place green triangle on pt door   8.Give pt urinal   9.Keep floor surfaces clean and dry  10.Keep pt areas uncluttered     12.Assess pt hourly for Pain, personal needs, position change, and call light access

## 2021-01-07 ENCOUNTER — HOSPITAL ENCOUNTER (OUTPATIENT)
Dept: HOSPITAL 8 - STAR | Age: 66
Discharge: HOME | End: 2021-01-07
Attending: SURGERY
Payer: COMMERCIAL

## 2021-01-07 DIAGNOSIS — Z01.818: Primary | ICD-10-CM

## 2021-01-07 DIAGNOSIS — Z20.828: ICD-10-CM

## 2021-01-07 DIAGNOSIS — I45.10: ICD-10-CM

## 2021-01-07 DIAGNOSIS — K42.9: ICD-10-CM

## 2021-01-07 LAB
ALBUMIN SERPL-MCNC: 4.2 G/DL (ref 3.4–5)
ALP SERPL-CCNC: 63 U/L (ref 45–117)
ALT SERPL-CCNC: 43 U/L (ref 12–78)
ANION GAP SERPL CALC-SCNC: 8 MMOL/L (ref 5–15)
BASOPHILS # BLD AUTO: 0.1 X10^3/UL (ref 0–0.1)
BASOPHILS NFR BLD AUTO: 1 % (ref 0–1)
BILIRUB SERPL-MCNC: 0.6 MG/DL (ref 0.2–1)
CALCIUM SERPL-MCNC: 9 MG/DL (ref 8.5–10.1)
CHLORIDE SERPL-SCNC: 104 MMOL/L (ref 98–107)
CREAT SERPL-MCNC: 1.1 MG/DL (ref 0.7–1.3)
EOSINOPHIL # BLD AUTO: 0.3 X10^3/UL (ref 0–0.4)
EOSINOPHIL NFR BLD AUTO: 4 % (ref 1–7)
ERYTHROCYTE [DISTWIDTH] IN BLOOD BY AUTOMATED COUNT: 14.1 % (ref 9.4–14.8)
LYMPHOCYTES # BLD AUTO: 2.3 X10^3/UL (ref 1–3.4)
LYMPHOCYTES NFR BLD AUTO: 25 % (ref 22–44)
MCH RBC QN AUTO: 31.8 PG (ref 27.5–34.5)
MCHC RBC AUTO-ENTMCNC: 34.2 G/DL (ref 33.2–36.2)
MD: NO
MONOCYTES # BLD AUTO: 0.9 X10^3/UL (ref 0.2–0.8)
MONOCYTES NFR BLD AUTO: 9 % (ref 2–9)
NEUTROPHILS # BLD AUTO: 5.7 X10^3/UL (ref 1.8–6.8)
NEUTROPHILS NFR BLD AUTO: 61 % (ref 42–75)
PLATELET # BLD AUTO: 282 X10^3/UL (ref 130–400)
PMV BLD AUTO: 8.1 FL (ref 7.4–10.4)
PROT SERPL-MCNC: 7.6 G/DL (ref 6.4–8.2)
RBC # BLD AUTO: 4.56 X10^6/UL (ref 4.38–5.82)

## 2021-01-07 PROCEDURE — 85025 COMPLETE CBC W/AUTO DIFF WBC: CPT

## 2021-01-07 PROCEDURE — 80053 COMPREHEN METABOLIC PANEL: CPT

## 2021-01-07 PROCEDURE — 93005 ELECTROCARDIOGRAM TRACING: CPT

## 2021-01-07 PROCEDURE — 71046 X-RAY EXAM CHEST 2 VIEWS: CPT

## 2021-01-07 PROCEDURE — 87635 SARS-COV-2 COVID-19 AMP PRB: CPT

## 2021-01-13 ENCOUNTER — HOSPITAL ENCOUNTER (OUTPATIENT)
Dept: HOSPITAL 8 - OUT | Age: 66
Discharge: HOME | End: 2021-01-13
Attending: SURGERY
Payer: MEDICARE

## 2021-01-13 VITALS — DIASTOLIC BLOOD PRESSURE: 99 MMHG | SYSTOLIC BLOOD PRESSURE: 148 MMHG

## 2021-01-13 VITALS — BODY MASS INDEX: 27 KG/M2 | WEIGHT: 182.32 LBS | HEIGHT: 69 IN

## 2021-01-13 DIAGNOSIS — I10: ICD-10-CM

## 2021-01-13 DIAGNOSIS — E78.5: ICD-10-CM

## 2021-01-13 DIAGNOSIS — Z88.8: ICD-10-CM

## 2021-01-13 DIAGNOSIS — J44.9: ICD-10-CM

## 2021-01-13 DIAGNOSIS — Z79.82: ICD-10-CM

## 2021-01-13 DIAGNOSIS — K42.0: Primary | ICD-10-CM

## 2021-01-13 DIAGNOSIS — Z79.899: ICD-10-CM

## 2021-01-13 DIAGNOSIS — Z82.49: ICD-10-CM

## 2021-01-13 DIAGNOSIS — Z72.89: ICD-10-CM

## 2021-01-13 DIAGNOSIS — Z87.891: ICD-10-CM

## 2021-01-13 PROCEDURE — 49587: CPT

## 2021-01-13 PROCEDURE — C1781 MESH (IMPLANTABLE): HCPCS

## 2021-01-13 RX ADMIN — FENTANYL CITRATE PRN MCG: 50 INJECTION INTRAMUSCULAR; INTRAVENOUS at 09:37

## 2021-01-13 RX ADMIN — FENTANYL CITRATE PRN MCG: 50 INJECTION INTRAMUSCULAR; INTRAVENOUS at 09:32

## 2021-01-13 RX ADMIN — FENTANYL CITRATE PRN MCG: 50 INJECTION INTRAMUSCULAR; INTRAVENOUS at 09:43

## 2021-01-13 RX ADMIN — FENTANYL CITRATE PRN MCG: 50 INJECTION INTRAMUSCULAR; INTRAVENOUS at 09:27

## 2022-01-18 NOTE — ED PROVIDER NOTES
ED Provider Note    CHIEF COMPLAINT   Chief Complaint   Patient presents with   • Laceration       HPI   Tai Long is a 63 y.o. male who presents with laceration dorsal surface left hand just proximal to the MP joint of the thumb.  Last tetanus shot unknown.  No loss of function to the hand.  No numbness or weakness.  Pain is minimal.  Patient states he has had difficulty stopping the bleeding.  Injury occurred one hour ago.  Patient was using woodworking tools, he states this was not work-related    REVIEW OF SYSTEMS   Musculoskeletal: No joint pain or bony pain to left hand  Neurologic: No numbness  Skin: Laceration      PAST MEDICAL HISTORY   Past Medical History:   Diagnosis Date   • BPH (benign prostatic hyperplasia)    • COPD (chronic obstructive pulmonary disease) ([facility])    • Dyslipidemia 8/9/2017   • Family history of coronary artery disease 8/9/2017   • Former smoker    • Pre-diabetes    • Right bundle branch block 8/9/2017   • Right bundle branch block 8/9/2017       FAMILY HISTORY  Family History   Problem Relation Age of Onset   • Heart Attack Father 55       SOCIAL HISTORY  Social History     Social History   • Marital status: Unknown     Spouse name: N/A   • Number of children: N/A   • Years of education: N/A     Social History Main Topics   • Smoking status: Former Smoker     Years: 45.00     Quit date: 1/1/2015   • Smokeless tobacco: Never Used   • Alcohol use Yes      Comment: 6/wk   • Drug use: No   • Sexual activity: Not on file     Other Topics Concern   • Not on file     Social History Narrative   • No narrative on file       SURGICAL HISTORY  History reviewed. No pertinent surgical history.    CURRENT MEDICATIONS   Home Medications    **Home medications have not yet been reviewed for this encounter**         ALLERGIES   Allergies   Allergen Reactions   • Hydrocodone-Acetaminophen    • Lisinopril Swelling     Face swelling       PHYSICAL EXAM  VITAL SIGNS: /100   Pulse (!) 117    Temp 37.3 °C (99.2 °F)   Resp 16   Wt 75.3 kg (166 lb 0.1 oz)   SpO2 94%   BMI 24.51 kg/m²   Skin: 2 cm laceration dorsum left hand proximal to the MP joint of the thumb..   Vascular: Intact distal capillary refill.   Musculoskeletal: Flexion and extension all fingers normal.  No evidence of tendon laceration.  No deformity  Neurologic: Sensation intact all fingers left hand    RADIOLOGY/PROCEDURES  Laceration Repair Procedure Note    Indication: Laceration    Procedure: The patient was placed in the appropriate position and anesthesia around the laceration was obtained by infiltration using 1% Lidocaine with epinephrine. The area was then cleansed with betadine and draped in a sterile fashion and irrigated with high pressure normal saline. The laceration was closed with 4-0 Ethilon using interrupted sutures. There were no additional lacerations requiring repair. The wound area was then dressed with bacitracin and a bandage.      Total repaired wound length: 2 cm.     Other Items: None    The patient tolerated the procedure well.    Complications: None          COURSE & MEDICAL DECISION MAKING  Pertinent Labs & Imaging studies reviewed. (See chart for details)  Patient advised to have stitches out in 7 days.  Given location of the laceration, he is placed on a 5 day course of Keflex for wound prophylaxis.  His tetanus shot was updated.  He is advised to return sooner if any concern of infection.    FINAL IMPRESSION     1. Laceration of left hand without foreign body, initial encounter              Electronically signed by: Lalo Dangelo, 6/26/2018 1:34 PM     Ear Star Wedge Flap Text: The defect edges were debeveled with a #15c blade scalpel.  Given the location of the defect and the proximity to free margins (helical rim) an ear star wedge flap was deemed most appropriate.  Using a sterile surgical marker, the appropriate flap was drawn incorporating the defect and placing the expected incisions between the helical rim and antihelix where possible.  The area thus outlined was incised through and through with a #15 scalpel blade.